# Patient Record
Sex: MALE | Race: ASIAN | NOT HISPANIC OR LATINO | Employment: FULL TIME | URBAN - METROPOLITAN AREA
[De-identification: names, ages, dates, MRNs, and addresses within clinical notes are randomized per-mention and may not be internally consistent; named-entity substitution may affect disease eponyms.]

---

## 2019-03-07 ENCOUNTER — APPOINTMENT (OUTPATIENT)
Dept: GENERAL RADIOLOGY | Facility: CLINIC | Age: 29
End: 2019-03-07
Attending: EMERGENCY MEDICINE
Payer: OTHER MISCELLANEOUS

## 2019-03-07 ENCOUNTER — HOSPITAL ENCOUNTER (EMERGENCY)
Facility: CLINIC | Age: 29
Discharge: HOME | End: 2019-03-07
Attending: EMERGENCY MEDICINE | Admitting: EMERGENCY MEDICINE
Payer: OTHER MISCELLANEOUS

## 2019-03-07 VITALS
TEMPERATURE: 98.7 F | WEIGHT: 180 LBS | DIASTOLIC BLOOD PRESSURE: 83 MMHG | SYSTOLIC BLOOD PRESSURE: 108 MMHG | RESPIRATION RATE: 18 BRPM | OXYGEN SATURATION: 100 % | HEART RATE: 94 BPM

## 2019-03-07 DIAGNOSIS — S40.851A ACUTE FOREIGN BODY OF RIGHT UPPER ARM, INITIAL ENCOUNTER: ICD-10-CM

## 2019-03-07 DIAGNOSIS — S41.111A ARM LACERATION, RIGHT, INITIAL ENCOUNTER: ICD-10-CM

## 2019-03-07 PROCEDURE — 73060 X-RAY EXAM OF HUMERUS: CPT | Mod: RT

## 2019-03-07 PROCEDURE — 25000128 H RX IP 250 OP 636: Performed by: EMERGENCY MEDICINE

## 2019-03-07 PROCEDURE — 90715 TDAP VACCINE 7 YRS/> IM: CPT | Performed by: EMERGENCY MEDICINE

## 2019-03-07 PROCEDURE — 25000132 ZZH RX MED GY IP 250 OP 250 PS 637: Performed by: EMERGENCY MEDICINE

## 2019-03-07 PROCEDURE — 99283 EMERGENCY DEPT VISIT LOW MDM: CPT | Mod: 25

## 2019-03-07 PROCEDURE — 90471 IMMUNIZATION ADMIN: CPT

## 2019-03-07 RX ORDER — BACITRACIN ZINC 500 [USP'U]/G
OINTMENT TOPICAL 2 TIMES DAILY
Qty: 15 G | Refills: 0 | Status: SHIPPED | OUTPATIENT
Start: 2019-03-07

## 2019-03-07 RX ORDER — IBUPROFEN 800 MG/1
800 TABLET, FILM COATED ORAL ONCE
Status: COMPLETED | OUTPATIENT
Start: 2019-03-07 | End: 2019-03-07

## 2019-03-07 RX ADMIN — CLOSTRIDIUM TETANI TOXOID ANTIGEN (FORMALDEHYDE INACTIVATED), CORYNEBACTERIUM DIPHTHERIAE TOXOID ANTIGEN (FORMALDEHYDE INACTIVATED), BORDETELLA PERTUSSIS TOXOID ANTIGEN (GLUTARALDEHYDE INACTIVATED), BORDETELLA PERTUSSIS FILAMENTOUS HEMAGGLUTININ ANTIGEN (FORMALDEHYDE INACTIVATED), BORDETELLA PERTUSSIS PERTACTIN ANTIGEN, AND BORDETELLA PERTUSSIS FIMBRIAE 2/3 ANTIGEN 0.5 ML: 5; 2; 2.5; 5; 3; 5 INJECTION, SUSPENSION INTRAMUSCULAR at 21:38

## 2019-03-07 RX ADMIN — IBUPROFEN 800 MG: 800 TABLET, FILM COATED ORAL at 21:38

## 2019-03-07 SDOH — HEALTH STABILITY: MENTAL HEALTH: HOW OFTEN DO YOU HAVE A DRINK CONTAINING ALCOHOL?: NEVER

## 2019-03-07 ASSESSMENT — ENCOUNTER SYMPTOMS: WOUND: 1

## 2019-03-07 NOTE — LETTER
March 7, 2019      To Whom It May Concern:      David Way was seen in our Emergency Department today, 03/07/19. He may return to work on 3/9/19.    Sincerely,        Khai Mao MD

## 2019-03-07 NOTE — ED AVS SNAPSHOT
[facility] Emergency Department  [address]  [facility] [address]  Phone:  [phone]  Fax:  [phone]                                    David Way   MRN: [id number]    Department:  [facility] Emergency Department   Date of Visit:  3/7/2019           After Visit Summary Signature Page    I have received my discharge instructions, and my questions have been answered. I have discussed any challenges I see with this plan with the nurse or doctor.    ..........................................................................................................................................  Patient/Patient Representative Signature      ..........................................................................................................................................  Patient Representative Print Name and Relationship to Patient    ..................................................               ................................................  Date                                   Time    ..........................................................................................................................................  Reviewed by Signature/Title    ...................................................              ..............................................  Date                                               Time          22EPIC Rev 08/18

## 2019-03-08 NOTE — ED TRIAGE NOTES
28-year-old male presents to the ER with complaints of a possible piece of metal hitting his right arm while at work. Unsure of a piece is still in there.

## 2019-03-08 NOTE — ED PROVIDER NOTES
"  History     Chief Complaint:  Wound Check    HPI   The patient's friend acted as an  to obtain the following history, as well as to explain the plan for care in the emergency department as well as upon discharge.     David Way is a 28 year old, otherwise healthy male who presents for evaluation of a wound to his right arm. Tonight, around 1830, he reports he was hammering a small metal plate at work when a piece broke off and possibly flew into his right arm. Immediately after this event, he reports his right hand went numb and he noted blood coming from a small wound in his upper arm. Because he was unsure if the metal became lodged in his arm, he decided to present to the ED. Here, he reports the worst pain is in his upper right arm, however it is \"bearable\" and he has not had to take analgesia. He denies any other medical problems, but he states his tetanus is not up to date.     Allergies:  NKDA     Medications:    The patient is currently on no regular medications.      Past Medical History:    The patient denies any significant past medical history.    Past Surgical History:    The patient does not have any pertinent past surgical history  Family  History:    No past pertinent family history.     Social History:  Injury happened at work.     Review of Systems   Skin: Positive for wound.   All other systems reviewed and are negative.      Physical Exam     Patient Vitals for the past 24 hrs:   BP Temp Temp src Pulse Resp SpO2 Weight   03/07/19 1940 108/83 98.7  F (37.1  C) Temporal 94 18 100 % 81.6 kg (180 lb)      Physical Exam   Skin:          General:   Pleasant, age appropriate.  HEENT:    Oropharynx is moist  EYES:   Conjunctiva normal.  NECK:    Supple, no meningismus.   CV:     Regular rate and rhythm     No murmurs, rubs or gallops.       2+ radial pulses bilateral.  PULM:    Clear to auscultation bilateral.       No respiratory distress.      No wheezing, rales or stridor.  ABD: "    Soft, non-tender, non-distended.       No rebound or guarding.  MSK:     Right upper extremity:      No pain with palpation or ROM of the shoulder, elbow or wrist.      Compartments are soft and compressible      Mild tenderness over the anterior aspect of the upper arm  LYMPH:   No cervical lymphadenopathy.  NEURO:   Right upper extremity:      Median, radial and ulnar nerve intact to motor and sensation.  SKIN:    Warm, dry  PSYCH:    Mood is good and affect is appropriate.      Emergency Department Course   Imaging:  Radiographic findings were communicated with the patient who voiced understanding of the findings.  XR Humerus 3 views, Right:   A punctate (1 to 2 mm) radiopaque metallic foreign body  is present within the antecubital medial soft tissues. No fractures  are identified. No evidence of soft tissue gas, as per radiology.     Interventions:  2138 Ibuprofen, 800 mg, PO   Tdap, 0.5 mL, IM injection    Emergency Department Course:  Nursing notes and vitals reviewed.   (2052) I performed an exam of the patient as documented above.      Medicine administered as documented above.     The patient was sent for a humerus x-ray while in the emergency department, findings above.      (2158)  I rechecked the patient and discussed the results of his workup thus far.      Findings and plan explained to the Patient and family. Patient discharged home with instructions regarding supportive care, medications, and reasons to return. The importance of close follow-up was reviewed. The patient was prescribed bacitracin ointment.      I personally reviewed the imaging results with the Patient and family and answered all related questions prior to discharge.       Impression & Plan      Medical Decision Making:  David Way is a 28 year old male seen in the ED with acute onset of right arm pain and concerns of metallic foreign body.  He has a 2 mm laceration to the medial upper arm that does not require surgical  closure.  X-ray reveals a 1-2 mm metallic fragment in the distal upper arm.  No concerns for intra-articular foreign body.  No evidence of neurovascular impairment.  Supportive measures indicated.  Tetanus updated.  Patient instructed to observe for signs of developing infection.  No indication for antibiotics.  Patient safe for discharge home.    Diagnosis:    ICD-10-CM    1. Acute foreign body of right upper arm, initial encounter S40.851A    2. Arm laceration, right, initial encounter S41.111A        Disposition:  discharged to home    Discharge Medications:     Medication List      Started    bacitracin 500 UNIT/GM external ointment  Topical, 2 TIMES DAILY          Scribe Disclosure:  I, Mirian Smart, am serving as a scribe on 3/7/2019 at 8:52 PM to personally document services performed by Khai Mao MD based on my observations and the provider's statements to me.       Mirian Smart  3/7/2019   [facility] EMERGENCY DEPARTMENT       Khai Mao MD  03/08/19 8846